# Patient Record
Sex: FEMALE | Race: WHITE | NOT HISPANIC OR LATINO | Employment: FULL TIME | URBAN - METROPOLITAN AREA
[De-identification: names, ages, dates, MRNs, and addresses within clinical notes are randomized per-mention and may not be internally consistent; named-entity substitution may affect disease eponyms.]

---

## 2022-08-30 PROBLEM — R31.0 GROSS HEMATURIA: Status: ACTIVE | Noted: 2022-08-30

## 2022-08-30 PROBLEM — N20.0 RENAL STONES: Status: ACTIVE | Noted: 2022-08-30

## 2022-08-30 PROBLEM — R35.1 NOCTURIA: Status: ACTIVE | Noted: 2022-08-30

## 2022-09-21 PROBLEM — I10 HYPERTENSION: Status: ACTIVE | Noted: 2022-09-21

## 2022-09-21 PROBLEM — Z95.0 PACEMAKER: Status: ACTIVE | Noted: 2022-09-21

## 2022-09-21 PROBLEM — I49.5 SICK SINUS SYNDROME: Status: ACTIVE | Noted: 2022-09-21

## 2022-09-21 PROBLEM — I48.0 PAROXYSMAL ATRIAL FIBRILLATION: Status: ACTIVE | Noted: 2022-09-21

## 2022-09-21 PROBLEM — I25.10 CAD (CORONARY ARTERY DISEASE): Status: ACTIVE | Noted: 2022-09-21

## 2022-09-21 PROBLEM — K21.9 GERD (GASTROESOPHAGEAL REFLUX DISEASE): Status: ACTIVE | Noted: 2022-09-21

## 2022-09-21 PROBLEM — J45.30 ASTHMA, MILD PERSISTENT: Status: ACTIVE | Noted: 2018-05-08

## 2022-10-04 PROBLEM — N20.0 RENAL STONES: Status: RESOLVED | Noted: 2022-08-30 | Resolved: 2022-10-04

## 2023-10-31 ENCOUNTER — TELEPHONE (OUTPATIENT)
Dept: ADMINISTRATIVE | Facility: HOSPITAL | Age: 73
End: 2023-10-31

## 2023-10-31 VITALS — SYSTOLIC BLOOD PRESSURE: 118 MMHG | DIASTOLIC BLOOD PRESSURE: 70 MMHG

## 2025-04-28 PROBLEM — R07.9 CHEST PAIN: Status: ACTIVE | Noted: 2025-04-28

## 2025-04-28 PROBLEM — Z45.328: Status: ACTIVE | Noted: 2025-04-28

## 2025-04-29 PROBLEM — R07.9 CHEST PAIN: Status: RESOLVED | Noted: 2025-04-28 | Resolved: 2025-04-29

## 2025-05-01 ENCOUNTER — PATIENT OUTREACH (OUTPATIENT)
Dept: ADMINISTRATIVE | Facility: CLINIC | Age: 75
End: 2025-05-01

## 2025-05-01 NOTE — PROGRESS NOTES
C3 nurse attempted to contact Sara Carrasquillo for a [facility] hospital follow up call. No answer and  Non [facility] pcp.

## 2025-05-02 NOTE — PROGRESS NOTES
C3 nurse spoke with Sara Carrasquillo for a [facility] post hospital discharge follow up call. The patient does not have a scheduled HOSFU appointment with Fady Clark MD within 5-7 days post hospital discharge date 4/30/25. C3 nurse was unable to schedule HOSFU appointment in Epic or route message to PCP.  Patient advised to call and schedule appt within 5-7 days of discharge.  The patient has a post op appt with Celina Justice ENT (Otolaryngology) on 5/7/2025; 830 AM.